# Patient Record
Sex: FEMALE | Race: BLACK OR AFRICAN AMERICAN | NOT HISPANIC OR LATINO | URBAN - METROPOLITAN AREA
[De-identification: names, ages, dates, MRNs, and addresses within clinical notes are randomized per-mention and may not be internally consistent; named-entity substitution may affect disease eponyms.]

---

## 2023-04-20 LAB
HEPATITIS B VIRUS SURFACE AG PRESENCE IN SERUM: NONREACTIVE
HEPATITIS C VIRUS AB PRESENCE IN SERUM: NONREACTIVE

## 2023-04-21 LAB
CHLAMYDIA TRACH., AMPLIFIED: NEGATIVE
CLUE CELLS: ABNORMAL
HERPES SIMPLEX VIRUS 1 IGG: <0.2 INDEX
HERPES SIMPLEX VIRUS 2 IGG: <0.2 INDEX
N. GONORRHEA, AMPLIFIED: NEGATIVE
NUGENT SCORE: 6
SYPHILIS TOTAL AB: NONREACTIVE
TRICHOMONAS VAGINALIS: NEGATIVE
YEAST: ABNORMAL

## 2023-04-26 LAB — HERPES SIMPLEX VIRUS I/II ANTIBODY, IGM: 0.61 IV

## 2023-09-24 LAB
HERPES SIMPLEX VIRUS 1 IGG: <0.2 INDEX
HERPES SIMPLEX VIRUS 2 IGG: <0.2 INDEX

## 2024-02-13 ENCOUNTER — APPOINTMENT (OUTPATIENT)
Dept: OBSTETRICS AND GYNECOLOGY | Facility: CLINIC | Age: 24
End: 2024-02-13
Payer: COMMERCIAL

## 2024-02-23 ENCOUNTER — OFFICE VISIT (OUTPATIENT)
Dept: OBSTETRICS AND GYNECOLOGY | Facility: CLINIC | Age: 24
End: 2024-02-23
Payer: COMMERCIAL

## 2024-02-23 VITALS
WEIGHT: 224 LBS | DIASTOLIC BLOOD PRESSURE: 64 MMHG | BODY MASS INDEX: 39.69 KG/M2 | SYSTOLIC BLOOD PRESSURE: 102 MMHG | HEIGHT: 63 IN

## 2024-02-23 DIAGNOSIS — Z20.2 ENCOUNTER FOR ASSESSMENT OF STD EXPOSURE: Primary | ICD-10-CM

## 2024-02-23 PROCEDURE — 99213 OFFICE O/P EST LOW 20 MIN: CPT | Performed by: ADVANCED PRACTICE MIDWIFE

## 2024-02-23 PROCEDURE — 1036F TOBACCO NON-USER: CPT | Performed by: ADVANCED PRACTICE MIDWIFE

## 2024-02-23 RX ORDER — ETONOGESTREL 68 MG/1
IMPLANT SUBCUTANEOUS
COMMUNITY

## 2024-02-23 ASSESSMENT — PAIN SCALES - GENERAL: PAINLEVEL: 0-NO PAIN

## 2024-02-23 NOTE — PROGRESS NOTES
Subjective   Patient ID: Mee Hall is a 23 y.o. female who presents for Follow-up (Retained condom ). Patient presents today for retained condom following intercourse on Wednesday. Patient states yesterday it came out. Patient denies any cramping or abnormal discharge. Patient has nexplanon implant   HPI  Patient states she was having intercourse with her partner on Wednesday and condom was retained. Yesterday it was retrieved.     Review of Systems  Negative except noted in subjective    Objective   Physical Exam  Patient is alert and oriented.   Pelvic exam done nothing foreign in vagina. EGBUS normal, vagina pink with normal discharge, no CMT noted to cervix.     Assessment/Plan   Problem List Items Addressed This Visit    None  Visit Diagnoses         Codes    Encounter for assessment of STD exposure    -  Primary Z20.2          Too early for STI testing in proximity to exposure will return for testing.      TOREY Almazan 02/23/24 1:57 PM

## 2024-03-05 ENCOUNTER — PROCEDURE VISIT (OUTPATIENT)
Dept: OBSTETRICS AND GYNECOLOGY | Facility: CLINIC | Age: 24
End: 2024-03-05

## 2024-03-05 VITALS
BODY MASS INDEX: 39.69 KG/M2 | WEIGHT: 224 LBS | DIASTOLIC BLOOD PRESSURE: 70 MMHG | SYSTOLIC BLOOD PRESSURE: 114 MMHG | HEIGHT: 63 IN

## 2024-03-05 DIAGNOSIS — Z30.46 NEXPLANON REMOVAL: Primary | ICD-10-CM

## 2024-03-05 DIAGNOSIS — Z30.011 OCP (ORAL CONTRACEPTIVE PILLS) INITIATION: ICD-10-CM

## 2024-03-05 PROCEDURE — 11982 REMOVE DRUG IMPLANT DEVICE: CPT | Performed by: ADVANCED PRACTICE MIDWIFE

## 2024-03-05 PROCEDURE — 99213 OFFICE O/P EST LOW 20 MIN: CPT | Performed by: ADVANCED PRACTICE MIDWIFE

## 2024-03-05 RX ORDER — LEVONORGESTREL AND ETHINYL ESTRADIOL 0.1-0.02MG
1 KIT ORAL DAILY
Qty: 28 TABLET | Refills: 12 | Status: SHIPPED | OUTPATIENT
Start: 2024-03-05 | End: 2025-03-05

## 2024-03-05 NOTE — PROGRESS NOTES
"Removal of contraception    Date/Time: 3/5/2024 9:30 AM    Performed by: TOREY Almazan  Authorized by: TOREY Almazan    Consent:     Consent obtained:  Written    Consent given by:  Patient    Procedural risks discussed:  Bleeding and infection    Patient questions answered: yes      Patient agrees, verbalizes understanding, and wants to proceed: yes    Indication:     Indication: Presence of non-biodegradable drug delivery implant    Pre-procedure:     Pre-procedure timeout performed: yes      Local anesthetic:  Lidocaine with epinephrine    The site was cleaned and prepped in a sterile fashion: yes    Procedure:     Procedure:  Removal    Small stab incision was made in arm: yes      Left/right:  Left  Comments:      Removed in less than 15 seconds from incision    Assessment/Plan   Risks, benefits, and expected side effects of available hormonal contraception methods were discussed, including IUDs, Nexplanon, Depo-Provera, vaginal ring, contraception patch, COCs, and POPs. Non-hormonal contraception methods including copper IUD, Phexxi, barrier methods, and fertility awareness were also discussed.     Mee Hall decided on OCP (estrogen/progesterone).    There are no diagnoses linked to this encounter.     TOREY Almazan    Subjective   Mee Hall is a 23 y.o. female who presents for contraception counseling. She desires to start another method of birth control today after the Nexplanon is removed.    Sexual Activity: sexually active, male partners; Patient reports 1 partners in the last 12 months.    Pertinent past medical history:  Migraine without aura .    Menstrual History:  OB History          1    Para        Term                AB   1    Living             SAB   1    IAB        Ectopic        Multiple        Live Births                      Patient's last menstrual period was 2024 (exact date).         Objective   /70   Ht 1.6 m (5' 3\")   " Wt 102 kg (224 lb)   LMP 02/06/2024 (Exact Date)   BMI 39.68 kg/m²     OBGyn Exam    A&O x 3  Calm and cooperative  Unlabored and even respirations    Lab Review  None

## 2024-06-12 ENCOUNTER — APPOINTMENT (OUTPATIENT)
Dept: RADIOLOGY | Facility: HOSPITAL | Age: 24
End: 2024-06-12
Payer: MEDICAID

## 2024-06-12 ENCOUNTER — HOSPITAL ENCOUNTER (EMERGENCY)
Facility: HOSPITAL | Age: 24
Discharge: HOME | End: 2024-06-12
Payer: MEDICAID

## 2024-06-12 VITALS
WEIGHT: 185 LBS | OXYGEN SATURATION: 98 % | TEMPERATURE: 98.6 F | HEIGHT: 63 IN | RESPIRATION RATE: 16 BRPM | DIASTOLIC BLOOD PRESSURE: 70 MMHG | SYSTOLIC BLOOD PRESSURE: 116 MMHG | BODY MASS INDEX: 32.78 KG/M2 | HEART RATE: 113 BPM

## 2024-06-12 DIAGNOSIS — S46.911A STRAIN OF RIGHT SHOULDER, INITIAL ENCOUNTER: ICD-10-CM

## 2024-06-12 DIAGNOSIS — S16.1XXA CERVICAL STRAIN, ACUTE, INITIAL ENCOUNTER: ICD-10-CM

## 2024-06-12 DIAGNOSIS — Z04.1 EXAM FOLLOWING MVC (MOTOR VEHICLE COLLISION), NO APPARENT INJURY: Primary | ICD-10-CM

## 2024-06-12 LAB — PREGNANCY TEST URINE, POC: NEGATIVE

## 2024-06-12 PROCEDURE — 2500000002 HC RX 250 W HCPCS SELF ADMINISTERED DRUGS (ALT 637 FOR MEDICARE OP, ALT 636 FOR OP/ED): Mod: SE | Performed by: PHYSICIAN ASSISTANT

## 2024-06-12 PROCEDURE — 73630 X-RAY EXAM OF FOOT: CPT | Mod: LEFT SIDE | Performed by: RADIOLOGY

## 2024-06-12 PROCEDURE — 99285 EMERGENCY DEPT VISIT HI MDM: CPT | Mod: 25

## 2024-06-12 PROCEDURE — 2500000004 HC RX 250 GENERAL PHARMACY W/ HCPCS (ALT 636 FOR OP/ED): Mod: SE | Performed by: PHYSICIAN ASSISTANT

## 2024-06-12 PROCEDURE — 72125 CT NECK SPINE W/O DYE: CPT

## 2024-06-12 PROCEDURE — 73130 X-RAY EXAM OF HAND: CPT | Mod: LT

## 2024-06-12 PROCEDURE — 81025 URINE PREGNANCY TEST: CPT | Performed by: PHYSICIAN ASSISTANT

## 2024-06-12 PROCEDURE — 73610 X-RAY EXAM OF ANKLE: CPT | Mod: LT

## 2024-06-12 PROCEDURE — 73090 X-RAY EXAM OF FOREARM: CPT | Mod: LT

## 2024-06-12 PROCEDURE — 73080 X-RAY EXAM OF ELBOW: CPT | Mod: LT

## 2024-06-12 PROCEDURE — 70450 CT HEAD/BRAIN W/O DYE: CPT

## 2024-06-12 PROCEDURE — 73610 X-RAY EXAM OF ANKLE: CPT | Mod: LEFT SIDE | Performed by: RADIOLOGY

## 2024-06-12 PROCEDURE — 2500000004 HC RX 250 GENERAL PHARMACY W/ HCPCS (ALT 636 FOR OP/ED): Mod: SE

## 2024-06-12 PROCEDURE — 96372 THER/PROPH/DIAG INJ SC/IM: CPT | Performed by: PHYSICIAN ASSISTANT

## 2024-06-12 PROCEDURE — 73030 X-RAY EXAM OF SHOULDER: CPT | Mod: LT

## 2024-06-12 PROCEDURE — 73630 X-RAY EXAM OF FOOT: CPT | Mod: LT

## 2024-06-12 PROCEDURE — 73110 X-RAY EXAM OF WRIST: CPT | Mod: LT

## 2024-06-12 RX ORDER — KETOROLAC TROMETHAMINE 15 MG/ML
INJECTION, SOLUTION INTRAMUSCULAR; INTRAVENOUS
Status: COMPLETED
Start: 2024-06-12 | End: 2024-06-12

## 2024-06-12 RX ORDER — BACITRACIN ZINC 500 UNIT/G
OINTMENT IN PACKET (EA) TOPICAL ONCE
Status: DISCONTINUED | OUTPATIENT
Start: 2024-06-12 | End: 2024-06-13 | Stop reason: HOSPADM

## 2024-06-12 RX ORDER — ORPHENADRINE CITRATE 100 MG/1
100 TABLET, EXTENDED RELEASE ORAL ONCE
Status: COMPLETED | OUTPATIENT
Start: 2024-06-12 | End: 2024-06-12

## 2024-06-12 RX ORDER — KETOROLAC TROMETHAMINE 30 MG/ML
30 INJECTION, SOLUTION INTRAMUSCULAR; INTRAVENOUS ONCE
Status: DISCONTINUED | OUTPATIENT
Start: 2024-06-12 | End: 2024-06-13 | Stop reason: HOSPADM

## 2024-06-12 RX ORDER — KETOROLAC TROMETHAMINE 30 MG/ML
30 INJECTION, SOLUTION INTRAMUSCULAR; INTRAVENOUS ONCE
Status: COMPLETED | OUTPATIENT
Start: 2024-06-12 | End: 2024-06-12

## 2024-06-12 RX ORDER — BACITRACIN ZINC 500 UNIT/G
1 OINTMENT (GRAM) TOPICAL 2 TIMES DAILY
Qty: 14 G | Refills: 0 | Status: SHIPPED | OUTPATIENT
Start: 2024-06-12 | End: 2024-06-22

## 2024-06-12 RX ORDER — METHOCARBAMOL 500 MG/1
500 TABLET, FILM COATED ORAL 2 TIMES DAILY
Qty: 20 TABLET | Refills: 0 | Status: SHIPPED | OUTPATIENT
Start: 2024-06-12 | End: 2024-06-22

## 2024-06-12 RX ORDER — KETOROLAC TROMETHAMINE 10 MG/1
10 TABLET, FILM COATED ORAL EVERY 6 HOURS PRN
Qty: 12 TABLET | Refills: 0 | Status: SHIPPED | OUTPATIENT
Start: 2024-06-12 | End: 2024-06-15

## 2024-06-12 ASSESSMENT — COLUMBIA-SUICIDE SEVERITY RATING SCALE - C-SSRS
2. HAVE YOU ACTUALLY HAD ANY THOUGHTS OF KILLING YOURSELF?: NO
6. HAVE YOU EVER DONE ANYTHING, STARTED TO DO ANYTHING, OR PREPARED TO DO ANYTHING TO END YOUR LIFE?: NO
1. IN THE PAST MONTH, HAVE YOU WISHED YOU WERE DEAD OR WISHED YOU COULD GO TO SLEEP AND NOT WAKE UP?: NO

## 2024-06-12 ASSESSMENT — PAIN SCALES - GENERAL: PAINLEVEL_OUTOF10: 8

## 2024-06-12 ASSESSMENT — PAIN - FUNCTIONAL ASSESSMENT: PAIN_FUNCTIONAL_ASSESSMENT: 0-10
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
## 2024-07-15 ENCOUNTER — APPOINTMENT (OUTPATIENT)
Dept: OBSTETRICS AND GYNECOLOGY | Facility: CLINIC | Age: 24
End: 2024-07-15
Payer: MEDICAID

## 2024-07-15 ENCOUNTER — LAB (OUTPATIENT)
Dept: LAB | Facility: LAB | Age: 24
End: 2024-07-15
Payer: MEDICAID

## 2024-07-15 VITALS — BODY MASS INDEX: 36.49 KG/M2 | WEIGHT: 206 LBS | DIASTOLIC BLOOD PRESSURE: 52 MMHG | SYSTOLIC BLOOD PRESSURE: 120 MMHG

## 2024-07-15 DIAGNOSIS — N91.2 AMENORRHEA: Primary | ICD-10-CM

## 2024-07-15 DIAGNOSIS — Z11.3 ENCOUNTER FOR SCREENING EXAMINATION FOR SEXUALLY TRANSMITTED DISEASE: ICD-10-CM

## 2024-07-15 LAB
HBV SURFACE AG SERPL QL IA: NONREACTIVE
HCV AB SER QL: NONREACTIVE
HIV 1+2 AB+HIV1 P24 AG SERPL QL IA: NONREACTIVE
PREGNANCY TEST URINE, POC: NEGATIVE
RUBV IGG SERPL IA-ACNC: 1.4 IA
RUBV IGG SERPL QL IA: POSITIVE
TREPONEMA PALLIDUM IGG+IGM AB [PRESENCE] IN SERUM OR PLASMA BY IMMUNOASSAY: NONREACTIVE

## 2024-07-15 PROCEDURE — 87591 N.GONORRHOEAE DNA AMP PROB: CPT

## 2024-07-15 PROCEDURE — 86317 IMMUNOASSAY INFECTIOUS AGENT: CPT

## 2024-07-15 PROCEDURE — 87661 TRICHOMONAS VAGINALIS AMPLIF: CPT

## 2024-07-15 PROCEDURE — 36415 COLL VENOUS BLD VENIPUNCTURE: CPT

## 2024-07-15 PROCEDURE — 87389 HIV-1 AG W/HIV-1&-2 AB AG IA: CPT

## 2024-07-15 PROCEDURE — 87491 CHLMYD TRACH DNA AMP PROBE: CPT

## 2024-07-15 PROCEDURE — 99213 OFFICE O/P EST LOW 20 MIN: CPT | Performed by: ADVANCED PRACTICE MIDWIFE

## 2024-07-15 PROCEDURE — 81025 URINE PREGNANCY TEST: CPT | Performed by: ADVANCED PRACTICE MIDWIFE

## 2024-07-15 PROCEDURE — 86780 TREPONEMA PALLIDUM: CPT

## 2024-07-15 PROCEDURE — 1036F TOBACCO NON-USER: CPT | Performed by: ADVANCED PRACTICE MIDWIFE

## 2024-07-15 PROCEDURE — 86803 HEPATITIS C AB TEST: CPT

## 2024-07-15 PROCEDURE — 87340 HEPATITIS B SURFACE AG IA: CPT

## 2024-07-15 NOTE — PROGRESS NOTES
Assessment/Plan   Problem List Items Addressed This Visit    None  Visit Diagnoses         Codes    Amenorrhea    -  Primary N91.2    Relevant Orders    POCT pregnancy, urine manually resulted (Completed)    Encounter for screening examination for sexually transmitted disease     Z11.3    Relevant Orders    C. trachomatis + N. gonorrhoeae, Amplified    Trichomonas vaginalis, Nucleic Acid Detection    HIV 1/2 Antigen/Antibody Screen with Reflex to Confirmation    Rubella IgG    Hepatitis B surface Ag    Hepatitis C Antibody    Syphilis Screen with Reflex        .    ASAF Almazan-HERBIE Smith   Mee Hall is a 23 y.o. who presents for sexually transmitted infection screening. Sexual history reviewed with the patient. Current symptoms include  routine testing .  Onset was unknown.  Patient has associated symptoms of none.   Longstanding history of irregular cycles.  Neg UPT.  Discussed with patient that if she does not have a menstrual cycle in 6 months to please call for Progesterone cycling.    STI exposures include none.     She is sexually active with 1 partners  She is sexually active with male  Patient is asymptomatic       Social History     Substance and Sexual Activity   Sexual Activity Yes    Partners: Male         Objective     PHYSICAL EXAM FOR SYMPTOMATIC PATIENT  Orientation:  A&O x 3  Mood: Calm/Cooperative  Skin: Warm and dry, appropriate color for ethnicity  Lungs: Unlabored, easy respirations

## 2024-07-16 LAB
C TRACH RRNA SPEC QL NAA+PROBE: NEGATIVE
N GONORRHOEA DNA SPEC QL PROBE+SIG AMP: NEGATIVE
T VAGINALIS RRNA SPEC QL NAA+PROBE: NEGATIVE

## 2024-10-24 ENCOUNTER — APPOINTMENT (OUTPATIENT)
Dept: PRIMARY CARE | Facility: CLINIC | Age: 24
End: 2024-10-24
Payer: MEDICAID

## 2024-12-10 ENCOUNTER — OFFICE VISIT (OUTPATIENT)
Dept: OBSTETRICS AND GYNECOLOGY | Facility: CLINIC | Age: 24
End: 2024-12-10
Payer: MEDICAID

## 2024-12-10 VITALS — SYSTOLIC BLOOD PRESSURE: 120 MMHG | WEIGHT: 215 LBS | DIASTOLIC BLOOD PRESSURE: 60 MMHG | BODY MASS INDEX: 38.09 KG/M2

## 2024-12-10 DIAGNOSIS — Z30.09 EMERGENCY CONTRACEPTIVE COUNSELING: ICD-10-CM

## 2024-12-10 DIAGNOSIS — N64.4 BREAST PAIN, RIGHT: ICD-10-CM

## 2024-12-10 DIAGNOSIS — Z11.3 ROUTINE SCREENING FOR STI (SEXUALLY TRANSMITTED INFECTION): Primary | ICD-10-CM

## 2024-12-10 LAB — PREGNANCY TEST URINE, POC: NEGATIVE

## 2024-12-10 PROCEDURE — 87491 CHLMYD TRACH DNA AMP PROBE: CPT

## 2024-12-10 PROCEDURE — 87661 TRICHOMONAS VAGINALIS AMPLIF: CPT

## 2024-12-10 PROCEDURE — 1036F TOBACCO NON-USER: CPT | Performed by: STUDENT IN AN ORGANIZED HEALTH CARE EDUCATION/TRAINING PROGRAM

## 2024-12-10 PROCEDURE — 99214 OFFICE O/P EST MOD 30 MIN: CPT | Performed by: STUDENT IN AN ORGANIZED HEALTH CARE EDUCATION/TRAINING PROGRAM

## 2024-12-10 PROCEDURE — 81025 URINE PREGNANCY TEST: CPT | Performed by: STUDENT IN AN ORGANIZED HEALTH CARE EDUCATION/TRAINING PROGRAM

## 2024-12-10 PROCEDURE — 87591 N.GONORRHOEAE DNA AMP PROB: CPT

## 2024-12-10 RX ORDER — LEVONORGESTREL 1.5 MG/1
1.5 TABLET ORAL ONCE
Qty: 1 TABLET | Refills: 1 | Status: SHIPPED | OUTPATIENT
Start: 2024-12-10 | End: 2024-12-10

## 2024-12-10 ASSESSMENT — ENCOUNTER SYMPTOMS
NEUROLOGICAL NEGATIVE: 0
CARDIOVASCULAR NEGATIVE: 0
RESPIRATORY NEGATIVE: 0
EYES NEGATIVE: 0
PSYCHIATRIC NEGATIVE: 0
ENDOCRINE NEGATIVE: 0
CONSTITUTIONAL NEGATIVE: 0
ALLERGIC/IMMUNOLOGIC NEGATIVE: 0
HEMATOLOGIC/LYMPHATIC NEGATIVE: 0
GASTROINTESTINAL NEGATIVE: 0
MUSCULOSKELETAL NEGATIVE: 0

## 2024-12-10 NOTE — PROGRESS NOTES
Subjective   Patient ID: Mee Hall is a 24 y.o. female who presents for Cramping (Patient states she is having cramping and pain in her pelvic area, And her right breast has felt swollen. Patient states she would like STI and STD testing done.).  23yo  presenting for breast pain and YUDY check.    LMP 11/-18, feels she is due for her period tomorrow. Noticed a breast lump and pain radiating up her chest for the last few days. Has nipple piercings, wasn't sure if it was related to irritation.   Also had intercourse on  with a male partner in which the condom either came off or was removed. She wants to be checked since she's having some discharge. She had previously been on nexplanon but didn't like it and tried OCPs but got pregnant. She is uncertain if she would like a hormonal contraceptive. Open to EC.       Review of Systems   All other systems reviewed and are negative.      Objective   Physical Exam  Vitals reviewed.   Constitutional:       Appearance: Normal appearance.   HENT:      Head: Normocephalic and atraumatic.      Mouth/Throat:      Mouth: Mucous membranes are moist.   Eyes:      Extraocular Movements: Extraocular movements intact.      Conjunctiva/sclera: Conjunctivae normal.      Pupils: Pupils are equal, round, and reactive to light.   Cardiovascular:      Rate and Rhythm: Normal rate.   Pulmonary:      Effort: Pulmonary effort is normal.   Chest:   Breasts:     Right: Tenderness present. No swelling, bleeding, inverted nipple or nipple discharge.      Left: Normal.          Comments: Small mobile cystic mass at left lateral edge of R nipple.   Abdominal:      Palpations: Abdomen is soft.   Musculoskeletal:         General: Normal range of motion.      Cervical back: Normal range of motion and neck supple.   Skin:     General: Skin is warm and dry.   Neurological:      General: No focal deficit present.      Mental Status: She is alert.   Psychiatric:         Mood and Affect: Mood  normal.         Behavior: Behavior normal.         Thought Content: Thought content normal.         Judgment: Judgment normal.       Assessment/Plan   Problem List Items Addressed This Visit             ICD-10-CM    Routine screening for STI (sexually transmitted infection) - Primary Z11.3    Relevant Orders    C. trachomatis / N. gonorrhoeae, Amplified    Trichomonas vaginalis, Amplified    POCT pregnancy, urine manually resulted (Completed)    Breast pain, right N64.4    Relevant Orders    BI US breast limited right    Emergency contraceptive counseling Z30.09    Relevant Medications    levonorgestrel (Plan B One-Step) 1.5 mg tablet     Contraceptive counseling performed. Plan for EC at this time. RTC as needed.     Nishi Magaña MD 12/10/24 9:19 AM

## 2024-12-12 DIAGNOSIS — A59.01 TRICHOMONAL VAGINITIS: Primary | ICD-10-CM

## 2024-12-12 LAB
C TRACH RRNA SPEC QL NAA+PROBE: NEGATIVE
N GONORRHOEA DNA SPEC QL PROBE+SIG AMP: NEGATIVE
T VAGINALIS RRNA SPEC QL NAA+PROBE: POSITIVE

## 2024-12-12 RX ORDER — METRONIDAZOLE 500 MG/1
500 TABLET ORAL 2 TIMES DAILY
Qty: 14 TABLET | Refills: 0 | Status: SHIPPED | OUTPATIENT
Start: 2024-12-12 | End: 2024-12-19

## 2024-12-17 ENCOUNTER — APPOINTMENT (OUTPATIENT)
Dept: RADIOLOGY | Facility: HOSPITAL | Age: 24
End: 2024-12-17
Payer: MEDICAID

## 2025-01-07 ENCOUNTER — APPOINTMENT (OUTPATIENT)
Dept: OBSTETRICS AND GYNECOLOGY | Facility: CLINIC | Age: 25
End: 2025-01-07
Payer: MEDICAID

## 2025-01-21 ENCOUNTER — APPOINTMENT (OUTPATIENT)
Dept: OBSTETRICS AND GYNECOLOGY | Facility: CLINIC | Age: 25
End: 2025-01-21
Payer: MEDICAID

## 2025-02-04 ENCOUNTER — APPOINTMENT (OUTPATIENT)
Dept: OBSTETRICS AND GYNECOLOGY | Facility: CLINIC | Age: 25
End: 2025-02-04
Payer: MEDICAID

## 2025-02-04 VITALS — HEIGHT: 63 IN | WEIGHT: 207 LBS | BODY MASS INDEX: 36.68 KG/M2

## 2025-02-04 SDOH — ECONOMIC STABILITY: FOOD INSECURITY: WITHIN THE PAST 12 MONTHS, YOU WORRIED THAT YOUR FOOD WOULD RUN OUT BEFORE YOU GOT MONEY TO BUY MORE.: NEVER TRUE

## 2025-02-04 SDOH — ECONOMIC STABILITY: FOOD INSECURITY: WITHIN THE PAST 12 MONTHS, THE FOOD YOU BOUGHT JUST DIDN'T LAST AND YOU DIDN'T HAVE MONEY TO GET MORE.: NEVER TRUE

## 2025-02-04 SDOH — ECONOMIC STABILITY: TRANSPORTATION INSECURITY
IN THE PAST 12 MONTHS, HAS LACK OF TRANSPORTATION KEPT YOU FROM MEETINGS, WORK, OR FROM GETTING THINGS NEEDED FOR DAILY LIVING?: NO

## 2025-02-04 SDOH — ECONOMIC STABILITY: INCOME INSECURITY: IN THE LAST 12 MONTHS, WAS THERE A TIME WHEN YOU WERE NOT ABLE TO PAY THE MORTGAGE OR RENT ON TIME?: NO

## 2025-02-04 SDOH — ECONOMIC STABILITY: TRANSPORTATION INSECURITY
IN THE PAST 12 MONTHS, HAS THE LACK OF TRANSPORTATION KEPT YOU FROM MEDICAL APPOINTMENTS OR FROM GETTING MEDICATIONS?: NO

## 2025-02-04 ASSESSMENT — COLUMBIA-SUICIDE SEVERITY RATING SCALE - C-SSRS
1. IN THE PAST MONTH, HAVE YOU WISHED YOU WERE DEAD OR WISHED YOU COULD GO TO SLEEP AND NOT WAKE UP?: NO
6. HAVE YOU EVER DONE ANYTHING, STARTED TO DO ANYTHING, OR PREPARED TO DO ANYTHING TO END YOUR LIFE?: NO
2. HAVE YOU ACTUALLY HAD ANY THOUGHTS OF KILLING YOURSELF?: NO

## 2025-02-04 ASSESSMENT — PATIENT HEALTH QUESTIONNAIRE - PHQ9
1. LITTLE INTEREST OR PLEASURE IN DOING THINGS: NOT AT ALL
2. FEELING DOWN, DEPRESSED OR HOPELESS: NOT AT ALL
SUM OF ALL RESPONSES TO PHQ9 QUESTIONS 1 AND 2: 0

## 2025-02-11 ENCOUNTER — APPOINTMENT (OUTPATIENT)
Dept: OBSTETRICS AND GYNECOLOGY | Facility: CLINIC | Age: 25
End: 2025-02-11
Payer: MEDICAID

## 2025-03-24 ENCOUNTER — APPOINTMENT (OUTPATIENT)
Dept: OBSTETRICS AND GYNECOLOGY | Facility: CLINIC | Age: 25
End: 2025-03-24
Payer: MEDICAID

## 2025-04-28 ENCOUNTER — OFFICE VISIT (OUTPATIENT)
Dept: OTOLARYNGOLOGY | Facility: CLINIC | Age: 25
End: 2025-04-28
Payer: MEDICAID

## 2025-04-28 VITALS
WEIGHT: 209.4 LBS | HEART RATE: 92 BPM | HEIGHT: 63 IN | SYSTOLIC BLOOD PRESSURE: 136 MMHG | DIASTOLIC BLOOD PRESSURE: 77 MMHG | BODY MASS INDEX: 37.1 KG/M2

## 2025-04-28 DIAGNOSIS — S02.2XXA CLOSED FRACTURE OF NASAL BONE, INITIAL ENCOUNTER: Primary | ICD-10-CM

## 2025-04-28 PROCEDURE — 31231 NASAL ENDOSCOPY DX: CPT | Performed by: STUDENT IN AN ORGANIZED HEALTH CARE EDUCATION/TRAINING PROGRAM

## 2025-04-28 PROCEDURE — 99214 OFFICE O/P EST MOD 30 MIN: CPT | Performed by: STUDENT IN AN ORGANIZED HEALTH CARE EDUCATION/TRAINING PROGRAM

## 2025-04-28 PROCEDURE — 1036F TOBACCO NON-USER: CPT | Performed by: STUDENT IN AN ORGANIZED HEALTH CARE EDUCATION/TRAINING PROGRAM

## 2025-04-28 PROCEDURE — 3008F BODY MASS INDEX DOCD: CPT | Performed by: STUDENT IN AN ORGANIZED HEALTH CARE EDUCATION/TRAINING PROGRAM

## 2025-04-28 NOTE — PROGRESS NOTES
Facial Plastic & Reconstructive Surgery    Referring Provider: No ref. provider found  CC: No ref. provider found    Chief complaint: Nasal obstruction.    LYNN Hall is a pleasant 24 y.o. female who presents in consultation for the evaluation of nasal obstruction.  The patient reports obstructed breathing, which is constant, present year round, does not fluctuate. It affects the patients ability to sleep, exercise, and is troubling during the day.   Symptoms began over three months ago.  The patient has used nasal steroid sprays for greater than 8 weeks without any benefit. The patient has trailed nasal cones/breathe right strips.      Site of obstruction:  none  Previous surgery: Denies  Trauma history: none  Allergic rhinitis, sinusitis history: denies  Smoking: denies  SHARON: denies    Aesthetic concern:   none    History obtained from: patient    Past Medical History  She has a past medical history of Other conditions influencing health status.    Past Surgical History  She has no past surgical history on file.     Social History  She reports that she has never smoked. She has never used smokeless tobacco. She reports current alcohol use. She reports current drug use. Drug: Marijuana.    Review of Systems:    11 point ROS was performed. Pertinent positives are for above complaint.  All other systems were negative.     Family History  Family History[1]     Allergies  Patient has no known allergies.    Physical exam    Vitals:    04/28/25 1422   BP: 136/77   Pulse: 92       General: No acute distress or pain   Head: Normal cephalic, atraumatic  Eyes: EOMI; PERRL; Normal lids, sclera, conjunctiva, and cornea; No epiphora; No nystagmus   Ears: Normal pinnae without any external deformities  Nose and Sinuses:  See Endoscopy procedure note below for findings  Oral Cavity: Good dentition; Normal gums, tongue, floor of the mouth, retromolar trigone, buccal mucosa, hard palate are normal; Tonsillar fossae,  palate, and uvula are normal without lesions or masses; Symmetric palatal elevation; FOM is soft; Tongue is midline upon protrusion without restriction, and without masses or lesions  Neck: Soft, supple; No lymphadenopathy; No thyromegaly or masses  Neuro: Cranial nerve 2-12 intact; Alert and oriented x3  Skin: Warm and well perfused; no lesions or rashes  Respiratory: Chest symmetric with bilateral expansion   Cardiac: No peripheral edema, no varicosities.     A comprehensive facial exam was performed with the following highlights:    Narrow airway bilaterally.  Deep inspiration /  Moderate inspiration produces dynamic collapse of the internal and external valves.   Modified Faustino Maneuver: Performed with a cotton tipped applicator, markedly improves breathing bilaterally.     Nasal analysis:     Radiographic Studies: A CT scan of the sinuses performed. Outside records reviewed. Pertinent radiology and labs reviewed.     Intranasal endoscopic examination performed with limited view on anterior rhinoscopy.    Data reviewed:  pertinent laboratory results    Procedures:    Procedure: Rigid diagnostic nasal endoscopy  Surgeon:  Shmuel Branham MD  Findings:  A 0-degree rigid endoscope was passed through the patient's bilateral naris via a 3 pass maneuver.  The first pass was along the floor of the nose to the nasopharynx.  The second pass was to the area of the middle meatus.  The third pass was to the sphenoethmoidal recess.    Septum:  no deviation, no perforations, synechia.   Internal Nasal Valve: Angle is reduced, narrowing the nasal airway bilaterally.    Right nasal cavity:      Inferior turbinate:  2+    Inferior meatus:  Clear, no discharge, no polyps/masses/lesions    Middle meatus:  Clear, no discharge, no polyps/masses/lesions   Left nasal cavity:    Inferior turbinate:  2+    Inferior meatus:  Clear, no discharge, no polyps/masses/lesions    Middle meatus:  Clear, no discharge, no  polyps/masses/lesions   Nasopharynx:  Clear, no discharge, no masses/lesions    Assessment/Plan:     Mee Hall is a pleasant 24 y.o. female presents with a history of nasal fracture who denies any SANTIAGO or cosmetic deformity. We discussed the medical necessity of this at length, as well as the risks and limitations of the procedures. All questions were answered.     Shmuel Branham MD      Division of Facial Plastic & Reconstructive Surgery  Department of Otolaryngology - Head and Neck Surgery        P: [phone]  F: [phone]         [1] No family history on file.

## 2025-04-29 ENCOUNTER — APPOINTMENT (OUTPATIENT)
Dept: OTOLARYNGOLOGY | Facility: CLINIC | Age: 25
End: 2025-04-29
Payer: MEDICAID

## 2025-04-29 NOTE — PROGRESS NOTES
Subjective   Mee Hall is a 24 y.o. female who presents for new patient visit to establish PCP care , and TB testing  and STI testing.    HPI:      24 y.o. female  who presents for new patient visit to establish PCP care, and for TB  and STI testing.     [facility] records reviewed    PMHx:  Trichomonas STD (12/2024)  History of nasal fracture   nasal obstruction  Pelvic cramping       Healthcare Providers:  ENT: Shmuel Branham MD   GYN: Nishi Magaña MD             Preventive Health Services:  -Last physical: 11/19/24  -last pap smear:  11/11/22 NEGATIVE FOR INTRAEPITHELIAL LESION OR MALIGNANCY.   -last mammogram: Never; patient denies family history of breast cancer.  Mammogram due at age 41  -last colonoscopy/cologuard: Never; patient denies family history of colon cancer.  Colon cancer screening due at age 45  -Hep C screening: Negative 7/15/2024       Immunizations:  -Childhood vaccines: completed per patient  -updated COVID spike vaccine: NOW DUE  -TDAP: NOW DUE      Immunization History   Administered Date(s) Administered    Moderna COVID-19 vaccine, 12 years and older (50mcg/0.5mL)(Spikevax) 12/09/2024                  Today   Mee reports:     - doing and feeling well     -not taking any     - Sexually active with 1 male partner.  Reports history of trichomonas December 2024 that she states was treated, however no subsequent test of cure available in EMR.  Is requesting STD testing.          Today  she has no other reported complaints, issues, or problems.  ROS is NEG for HEADACHE, NAUSEA, VOMITING, DIARRHEA, CHEST PAIN, SOB, and BLEEDING.  Review of systems (12) is negative for all systems except for any identified issues in HPI above.        Objective     /78   Pulse 81   Temp 36.6 °C (97.9 °F)   Wt 95.8 kg (211 lb 3.2 oz)   LMP 04/30/2025   SpO2 98%   BMI 37.41 kg/m²      PHYSICAL EXAM    GENERAL           General Appearance: pleasant, well-appearing, well-developed,  well-hydrated, well-nourished, well-groomed, acute distress.        HEENT           NECK supple, Neg for adneopathy no thyroid enlargement or nodules, Oropharynx normal no exudates.        EYES           Pupils: PERRLA, no photophobia.        HEART           Rate and Rhythm regular rate and rhythm. Heart sounds: normal S1S2. Murmurs: none.        LUNGS           Effort: Normal chest wall, no pectus, Normal air entry all fields, Clear to IPPA, RR<16 with no use of accessory muscles.             ABDOMEN           General: Normal to inspection, neg for LKKS or masses and BSs heard in all quadrants                 MUSCULOSKELETAL           gross abnormalities no gross abnormalities, no joint redness or swelling.        EXTREMITIES           Varicose veins: not present. Pulses: 2+ bilateral. Clubbing: none. Cyanosis: no.         SKIN: wart on left thumb, no ulcerations, no signs of infection.       Assessment/Plan   Problem List Items Addressed This Visit       Routine screening for STI (sexually transmitted infection)    History of trichomonal vaginitis - Primary     Other Visit Diagnoses         Screening-pulmonary TB          Nasal obstruction          Pelvic cramping          Vitamin D deficiency          Immunization counseling          Encounter for medical examination to establish care          Encounter to establish care              Establish PCP care      History of trichomonas 12/2024: Patient reports that she underwent treatment for trichomonas.  No available test of cure in EMR  -GC/CT/ trichomonas ordered  -Patient advised to use condoms to prevent STIs    TB screening  - QuantiFERON gold ordered    Wart on left thumb: no red flag sign/sxs  -referral to dermatology ordered    Pelvic cramping: Followed by GYN  - Continue management by GYN  - Emergency department precautions discussed and reviewed with patient    Nasal obstruction: Followed by ENT  - Continue management by ENT      Vitamin D deficiency  -Vit  D levels ordered        Counseling:      Medication education:        Education:  The patient is counseled regarding potential side-effects of all new medications        Understanding:  Patient expressed understanding        Adherence:  No barriers to adherence identified        Immunizations Counseling  -TDAP and Gardasil now due==> declined; patient unsure if received HPV vaccine and date of last TDAP  -recommend updated COVID spike vaccine that can be obtained at local pharmacy     FOLLOW-UP: 6 months  for annual physical    I have personally reviewed all available pertinent labs, imaging, and consult notes with the patient.     Discussed recommended plan of care with patient. Patient expressed understanding and agreement with plan of care. All of patient's  questions were answered at the time. Patient had no additional questions at the time.         Yola Aranda MD, PhD

## 2025-04-30 ENCOUNTER — OFFICE VISIT (OUTPATIENT)
Dept: PRIMARY CARE | Facility: CLINIC | Age: 25
End: 2025-04-30
Payer: MEDICAID

## 2025-04-30 VITALS
OXYGEN SATURATION: 98 % | BODY MASS INDEX: 37.41 KG/M2 | WEIGHT: 211.2 LBS | HEART RATE: 81 BPM | TEMPERATURE: 97.9 F | SYSTOLIC BLOOD PRESSURE: 118 MMHG | DIASTOLIC BLOOD PRESSURE: 78 MMHG

## 2025-04-30 DIAGNOSIS — B07.9 WART ON THUMB: ICD-10-CM

## 2025-04-30 DIAGNOSIS — Z71.85 IMMUNIZATION COUNSELING: ICD-10-CM

## 2025-04-30 DIAGNOSIS — Z00.00 ENCOUNTER FOR MEDICAL EXAMINATION TO ESTABLISH CARE: ICD-10-CM

## 2025-04-30 DIAGNOSIS — Z76.89 ENCOUNTER TO ESTABLISH CARE: ICD-10-CM

## 2025-04-30 DIAGNOSIS — J34.89 NASAL OBSTRUCTION: ICD-10-CM

## 2025-04-30 DIAGNOSIS — Z11.3 ROUTINE SCREENING FOR STI (SEXUALLY TRANSMITTED INFECTION): ICD-10-CM

## 2025-04-30 DIAGNOSIS — Z86.19 HISTORY OF TRICHOMONAL VAGINITIS: Primary | ICD-10-CM

## 2025-04-30 DIAGNOSIS — R10.2 PELVIC CRAMPING: ICD-10-CM

## 2025-04-30 DIAGNOSIS — Z11.1 SCREENING-PULMONARY TB: ICD-10-CM

## 2025-04-30 DIAGNOSIS — E55.9 VITAMIN D DEFICIENCY: ICD-10-CM

## 2025-04-30 PROCEDURE — 1036F TOBACCO NON-USER: CPT | Performed by: FAMILY MEDICINE

## 2025-04-30 PROCEDURE — 99204 OFFICE O/P NEW MOD 45 MIN: CPT | Performed by: FAMILY MEDICINE

## 2025-04-30 PROCEDURE — 99214 OFFICE O/P EST MOD 30 MIN: CPT | Performed by: FAMILY MEDICINE

## 2025-04-30 ASSESSMENT — PATIENT HEALTH QUESTIONNAIRE - PHQ9
SUM OF ALL RESPONSES TO PHQ9 QUESTIONS 1 AND 2: 0
2. FEELING DOWN, DEPRESSED OR HOPELESS: NOT AT ALL
1. LITTLE INTEREST OR PLEASURE IN DOING THINGS: NOT AT ALL

## 2025-04-30 ASSESSMENT — PAIN SCALES - GENERAL: PAINLEVEL_OUTOF10: 0-NO PAIN

## 2025-05-03 LAB
25(OH)D3+25(OH)D2 SERPL-MCNC: 26 NG/ML (ref 30–100)
C TRACH RRNA SPEC QL NAA+PROBE: NOT DETECTED
IGNF NEG CNTRL BLD: NORMAL
M TB IFN-G BLD-IMP: NEGATIVE
MITOGEN IGNF.SPOT COUNT BLD: NORMAL
N GONORRHOEA RRNA SPEC QL NAA+PROBE: NOT DETECTED
QUEST GC CT AMPLIFIED (ALWAYS MESSAGE): NORMAL
QUEST PANEL A SPOT COUNT: 0
QUEST PANEL B SPOT COUNT: 0
T VAGINALIS RRNA SPEC QL NAA+PROBE: NOT DETECTED

## 2025-05-15 ENCOUNTER — OFFICE VISIT (OUTPATIENT)
Facility: CLINIC | Age: 25
End: 2025-05-15
Payer: MEDICAID

## 2025-05-15 VITALS
DIASTOLIC BLOOD PRESSURE: 74 MMHG | BODY MASS INDEX: 37.1 KG/M2 | WEIGHT: 209.4 LBS | SYSTOLIC BLOOD PRESSURE: 118 MMHG | HEIGHT: 63 IN

## 2025-05-15 DIAGNOSIS — Z12.4 SCREENING FOR CERVICAL CANCER: Primary | ICD-10-CM

## 2025-05-15 DIAGNOSIS — Z01.419 WELL WOMAN EXAM: ICD-10-CM

## 2025-05-15 PROCEDURE — 99395 PREV VISIT EST AGE 18-39: CPT | Performed by: ADVANCED PRACTICE MIDWIFE

## 2025-05-15 PROCEDURE — 3008F BODY MASS INDEX DOCD: CPT | Performed by: ADVANCED PRACTICE MIDWIFE

## 2025-05-15 PROCEDURE — 1036F TOBACCO NON-USER: CPT | Performed by: ADVANCED PRACTICE MIDWIFE

## 2025-05-15 ASSESSMENT — PAIN SCALES - GENERAL: PAINLEVEL_OUTOF10: 5

## 2025-05-15 ASSESSMENT — ENCOUNTER SYMPTOMS
OCCASIONAL FEELINGS OF UNSTEADINESS: 0
LOSS OF SENSATION IN FEET: 0
DEPRESSION: 0

## 2025-05-15 ASSESSMENT — PATIENT HEALTH QUESTIONNAIRE - PHQ9
2. FEELING DOWN, DEPRESSED OR HOPELESS: NOT AT ALL
1. LITTLE INTEREST OR PLEASURE IN DOING THINGS: NOT AT ALL
SUM OF ALL RESPONSES TO PHQ9 QUESTIONS 1 AND 2: 0

## 2025-05-15 NOTE — PROGRESS NOTES
"Assessment/Plan   Problem List Items Addressed This Visit    None  Visit Diagnoses         Codes      Screening for cervical cancer    -  Primary Z12.4    Relevant Orders    THINPREP PAP TEST (21-24)      Well woman exam     Z01.419            ASAF Almazan-HERBIE     Sarah Hall is a 24 y.o. female who is here for a routine exam. Periods are regular every 28-30 days, lasting 5 days. Dysmenorrhea:mild, occurring first 1-2 days of flow. Cyclic symptoms include breast tenderness. No intermenstrual bleeding, spotting, or discharge.    Birth control:  None  Last pap:  Neg 2022    ROS      /74   Ht 1.6 m (5' 3\")   Wt 95 kg (209 lb 6.4 oz)   LMP 04/28/2025 (Exact Date)   BMI 37.09 kg/m²     General:   Alert and oriented x 3   Heart:  Thyroid: Regular rate, rhythm  Euthyroid, normal shape and size   Lungs:  Breast: Clear to auscultation bilaterally  Symmetrical, no skin changes/nipple discharge, redness, tenderness, no masses palpated bilaterally   Abdomen: Soft, non tender   Vulva: EGBUS normal   Vagina: Pink, normal discharge   Cervix: No CMT   Uterus: Normal shape, size   Adnexa: NT bilaterally       Thank you for coming to see me for your visit today and most importantly thank you for having a preventative visit.  If lab work was sent, you will see your test result via [employer]  Any prescriptions will be sent electronically to your pharmacy listed    I look forward to seeing you next year for your health care needs.  Please remember:   Sleep is important - make it a priority for at least 6 hours per night!   Exercise such as walking for 20 minutes per day is beneficial to your physical and mental health   Healthy diets can be expensive -- if you every feel like you are struggling to afford healthy food, please let me know as we have a very good program called [employer] that is available to you   Decrease alcohol and tobacco.  A goal of less than 7 alcoholic drinks per week is recommended " for risk reduction of breast and colon cancer by 38%!    Be well!  Neeru

## 2025-06-02 LAB
CYTOLOGY CMNT CVX/VAG CYTO-IMP: NORMAL
LAB AP HPV HR: NORMAL
LABORATORY COMMENT REPORT: NORMAL
LMP START DATE: NORMAL
PATH REPORT.TOTAL CANCER: NORMAL

## 2025-07-30 ENCOUNTER — APPOINTMENT (OUTPATIENT)
Dept: OBSTETRICS AND GYNECOLOGY | Facility: CLINIC | Age: 25
End: 2025-07-30
Payer: MEDICAID

## 2025-07-30 VITALS — SYSTOLIC BLOOD PRESSURE: 110 MMHG | WEIGHT: 207 LBS | DIASTOLIC BLOOD PRESSURE: 60 MMHG | BODY MASS INDEX: 36.67 KG/M2

## 2025-07-30 DIAGNOSIS — N92.6 MISSED MENSES: Primary | ICD-10-CM

## 2025-07-30 DIAGNOSIS — Z30.09 GENERAL COUNSELING AND ADVICE FOR CONTRACEPTIVE MANAGEMENT: ICD-10-CM

## 2025-07-30 PROCEDURE — 99214 OFFICE O/P EST MOD 30 MIN: CPT | Performed by: STUDENT IN AN ORGANIZED HEALTH CARE EDUCATION/TRAINING PROGRAM

## 2025-07-30 ASSESSMENT — PAIN SCALES - GENERAL: PAINLEVEL_OUTOF10: 0-NO PAIN

## 2025-07-30 NOTE — PROGRESS NOTES
Subjective   Patient ID: Mee Hall is a 24 y.o. female who presents for missed menses.   LMP 6/24. UPT in office +.  This is an undesired pregnancy. She is starting nursing school next week. Would like to discuss options for EAB.   Has tried Nexplanon, patch & OCPs in the past. Bleed continuously on Nexplanon, had a rash to the patch, and could not remember to take pills.         Review of Systems   All other systems reviewed and are negative.      Objective   Physical Exam  Vitals reviewed.   Constitutional:       Appearance: Normal appearance.   HENT:      Head: Normocephalic and atraumatic.      Mouth/Throat:      Mouth: Mucous membranes are moist.     Eyes:      Extraocular Movements: Extraocular movements intact.      Conjunctiva/sclera: Conjunctivae normal.      Pupils: Pupils are equal, round, and reactive to light.       Cardiovascular:      Rate and Rhythm: Normal rate.   Pulmonary:      Effort: Pulmonary effort is normal.   Abdominal:      Palpations: Abdomen is soft.     Musculoskeletal:         General: Normal range of motion.      Cervical back: Normal range of motion and neck supple.     Skin:     General: Skin is warm and dry.     Neurological:      General: No focal deficit present.      Mental Status: She is alert.     Psychiatric:         Mood and Affect: Mood normal.         Behavior: Behavior normal.         Thought Content: Thought content normal.         Judgment: Judgment normal.         Assessment/Plan   Problem List Items Addressed This Visit           ICD-10-CM    Missed menses - Primary N92.6    General counseling and advice for contraceptive management Z30.09     Provided resources to , PP, and [facility]. Discussed option for contraception after, patient to consider depo vs Mirena IUD. RTC for further discussion.        Nishi Magaña MD 25 10:49 AM

## 2025-09-09 ENCOUNTER — APPOINTMENT (OUTPATIENT)
Dept: OBSTETRICS AND GYNECOLOGY | Facility: CLINIC | Age: 25
End: 2025-09-09
Payer: MEDICAID